# Patient Record
Sex: FEMALE | Race: WHITE | NOT HISPANIC OR LATINO | URBAN - METROPOLITAN AREA
[De-identification: names, ages, dates, MRNs, and addresses within clinical notes are randomized per-mention and may not be internally consistent; named-entity substitution may affect disease eponyms.]

---

## 2017-09-25 ENCOUNTER — FOLLOW UP ESTABLISHED (OUTPATIENT)
Dept: URBAN - METROPOLITAN AREA CLINIC 24 | Facility: CLINIC | Age: 72
End: 2017-09-25
Payer: MEDICARE

## 2017-09-25 DIAGNOSIS — H18.59 OTHER HEREDITARY CORNEAL DYSTROPHIES: ICD-10-CM

## 2017-09-25 DIAGNOSIS — Z98.890 OTHER SPECIFIED POSTPROCEDURAL STATES: ICD-10-CM

## 2017-09-25 PROCEDURE — 92014 COMPRE OPH EXAM EST PT 1/>: CPT | Performed by: OPTOMETRIST

## 2017-09-25 ASSESSMENT — INTRAOCULAR PRESSURE
OD: 26
OS: 24

## 2017-09-25 ASSESSMENT — VISUAL ACUITY
OD: 20/25
OS: 20/30

## 2017-10-13 ENCOUNTER — FOLLOW UP ESTABLISHED (OUTPATIENT)
Dept: URBAN - METROPOLITAN AREA CLINIC 24 | Facility: CLINIC | Age: 72
End: 2017-10-13
Payer: MEDICARE

## 2017-10-13 DIAGNOSIS — H40.053 OCULAR HYPERTENSION, BILATERAL: Primary | ICD-10-CM

## 2017-10-13 DIAGNOSIS — H04.123 DRY EYE SYNDROME OF BILATERAL LACRIMAL GLANDS: ICD-10-CM

## 2017-10-13 PROCEDURE — 92133 CPTRZD OPH DX IMG PST SGM ON: CPT | Performed by: OPTOMETRIST

## 2017-10-13 PROCEDURE — 92250 FUNDUS PHOTOGRAPHY W/I&R: CPT | Performed by: OPTOMETRIST

## 2017-10-13 PROCEDURE — 92083 EXTENDED VISUAL FIELD XM: CPT | Performed by: OPTOMETRIST

## 2017-10-13 PROCEDURE — 92012 INTRM OPH EXAM EST PATIENT: CPT | Performed by: OPTOMETRIST

## 2017-10-13 ASSESSMENT — INTRAOCULAR PRESSURE
OS: 27
OS: 22
OD: 25
OD: 33

## 2017-10-30 ENCOUNTER — FOLLOW UP ESTABLISHED (OUTPATIENT)
Dept: URBAN - METROPOLITAN AREA CLINIC 24 | Facility: CLINIC | Age: 72
End: 2017-10-30
Payer: MEDICARE

## 2017-10-30 PROCEDURE — 92012 INTRM OPH EXAM EST PATIENT: CPT | Performed by: OPTOMETRIST

## 2017-10-30 RX ORDER — BIMATOPROST 0.1 MG/ML
0.01 % SOLUTION/ DROPS OPHTHALMIC
Qty: 1 | Refills: 5 | Status: INACTIVE
Start: 2017-10-30 | End: 2019-02-15

## 2017-10-30 ASSESSMENT — INTRAOCULAR PRESSURE
OS: 18
OD: 18

## 2018-02-27 ENCOUNTER — FOLLOW UP ESTABLISHED (OUTPATIENT)
Dept: URBAN - METROPOLITAN AREA CLINIC 26 | Facility: CLINIC | Age: 73
End: 2018-02-27
Payer: MEDICARE

## 2018-02-27 PROCEDURE — 92012 INTRM OPH EXAM EST PATIENT: CPT | Performed by: OPTOMETRIST

## 2018-02-27 PROCEDURE — 92083 EXTENDED VISUAL FIELD XM: CPT | Performed by: OPTOMETRIST

## 2018-02-27 ASSESSMENT — INTRAOCULAR PRESSURE
OS: 23
OD: 23

## 2018-07-17 ENCOUNTER — FOLLOW UP ESTABLISHED (OUTPATIENT)
Dept: URBAN - METROPOLITAN AREA CLINIC 26 | Facility: CLINIC | Age: 73
End: 2018-07-17
Payer: MEDICARE

## 2018-07-17 PROCEDURE — 92020 GONIOSCOPY: CPT | Performed by: OPTOMETRIST

## 2018-07-17 PROCEDURE — 92012 INTRM OPH EXAM EST PATIENT: CPT | Performed by: OPTOMETRIST

## 2018-07-17 PROCEDURE — 92250 FUNDUS PHOTOGRAPHY W/I&R: CPT | Performed by: OPTOMETRIST

## 2018-07-17 ASSESSMENT — INTRAOCULAR PRESSURE
OD: 19
OS: 18

## 2018-11-19 ENCOUNTER — FOLLOW UP ESTABLISHED (OUTPATIENT)
Dept: URBAN - METROPOLITAN AREA CLINIC 26 | Facility: CLINIC | Age: 73
End: 2018-11-19
Payer: MEDICARE

## 2018-11-19 PROCEDURE — 92014 COMPRE OPH EXAM EST PT 1/>: CPT | Performed by: OPTOMETRIST

## 2018-11-19 PROCEDURE — 92083 EXTENDED VISUAL FIELD XM: CPT | Performed by: OPTOMETRIST

## 2018-11-19 PROCEDURE — 92133 CPTRZD OPH DX IMG PST SGM ON: CPT | Performed by: OPTOMETRIST

## 2018-11-19 ASSESSMENT — VISUAL ACUITY
OS: 20/25
OD: 20/25

## 2018-11-19 ASSESSMENT — INTRAOCULAR PRESSURE
OS: 17
OD: 15

## 2019-05-21 ENCOUNTER — FOLLOW UP ESTABLISHED (OUTPATIENT)
Dept: URBAN - METROPOLITAN AREA CLINIC 26 | Facility: CLINIC | Age: 74
End: 2019-05-21
Payer: MEDICARE

## 2019-05-21 DIAGNOSIS — H10.45 OTHER CHRONIC ALLERGIC CONJUNCTIVITIS: ICD-10-CM

## 2019-05-21 PROCEDURE — 92250 FUNDUS PHOTOGRAPHY W/I&R: CPT | Performed by: OPTOMETRIST

## 2019-05-21 PROCEDURE — 92012 INTRM OPH EXAM EST PATIENT: CPT | Performed by: OPTOMETRIST

## 2019-05-21 ASSESSMENT — INTRAOCULAR PRESSURE
OS: 20
OD: 18

## 2019-07-29 ENCOUNTER — FOLLOW UP ESTABLISHED (OUTPATIENT)
Dept: URBAN - METROPOLITAN AREA CLINIC 26 | Facility: CLINIC | Age: 74
End: 2019-07-29
Payer: MEDICARE

## 2019-07-29 PROCEDURE — 92133 CPTRZD OPH DX IMG PST SGM ON: CPT | Performed by: OPTOMETRIST

## 2019-07-29 PROCEDURE — 92012 INTRM OPH EXAM EST PATIENT: CPT | Performed by: OPTOMETRIST

## 2019-07-29 ASSESSMENT — KERATOMETRY
OD: 43.88
OS: 44.88

## 2019-07-29 ASSESSMENT — INTRAOCULAR PRESSURE
OD: 14
OS: 21

## 2019-07-29 ASSESSMENT — VISUAL ACUITY
OS: 20/40
OD: 20/30

## 2020-01-13 ENCOUNTER — FOLLOW UP ESTABLISHED (OUTPATIENT)
Dept: URBAN - METROPOLITAN AREA CLINIC 26 | Facility: CLINIC | Age: 75
End: 2020-01-13
Payer: MEDICARE

## 2020-01-13 DIAGNOSIS — H35.3122 NONEXUDATIVE MACULAR DEGENERATION, INTERMEDIATE DRY STAGE, LEFT EYE: ICD-10-CM

## 2020-01-13 DIAGNOSIS — Z96.1 PRESENCE OF INTRAOCULAR LENS: ICD-10-CM

## 2020-01-13 PROCEDURE — 92134 CPTRZ OPH DX IMG PST SGM RTA: CPT | Performed by: OPTOMETRIST

## 2020-01-13 PROCEDURE — 92083 EXTENDED VISUAL FIELD XM: CPT | Performed by: OPTOMETRIST

## 2020-01-13 PROCEDURE — 92014 COMPRE OPH EXAM EST PT 1/>: CPT | Performed by: OPTOMETRIST

## 2020-01-13 RX ORDER — BIMATOPROST 0.1 MG/ML
0.01 % SOLUTION/ DROPS OPHTHALMIC
Qty: 3 | Refills: 3 | Status: INACTIVE
Start: 2020-01-13 | End: 2021-07-13

## 2020-01-13 ASSESSMENT — KERATOMETRY
OD: 43.75
OS: 44.75

## 2020-01-13 ASSESSMENT — VISUAL ACUITY
OD: 20/30
OS: 20/50

## 2020-01-13 ASSESSMENT — INTRAOCULAR PRESSURE
OD: 14
OS: 20

## 2020-07-13 ENCOUNTER — FOLLOW UP ESTABLISHED (OUTPATIENT)
Dept: URBAN - METROPOLITAN AREA CLINIC 26 | Facility: CLINIC | Age: 75
End: 2020-07-13
Payer: MEDICARE

## 2020-07-13 PROCEDURE — 92012 INTRM OPH EXAM EST PATIENT: CPT | Performed by: OPTOMETRIST

## 2020-07-13 PROCEDURE — 92133 CPTRZD OPH DX IMG PST SGM ON: CPT | Performed by: OPTOMETRIST

## 2020-07-13 ASSESSMENT — INTRAOCULAR PRESSURE
OD: 13
OD: 14
OS: 17

## 2021-01-13 ENCOUNTER — FOLLOW UP ESTABLISHED (OUTPATIENT)
Dept: URBAN - METROPOLITAN AREA CLINIC 26 | Facility: CLINIC | Age: 76
End: 2021-01-13
Payer: MEDICARE

## 2021-01-13 DIAGNOSIS — H35.3131 NONEXUDATIVE MACULAR DEGENERATION, EARLY DRY STAGE, BILATERAL: ICD-10-CM

## 2021-01-13 PROCEDURE — 92134 CPTRZ OPH DX IMG PST SGM RTA: CPT | Performed by: OPTOMETRIST

## 2021-01-13 PROCEDURE — 92083 EXTENDED VISUAL FIELD XM: CPT | Performed by: OPTOMETRIST

## 2021-01-13 PROCEDURE — 92014 COMPRE OPH EXAM EST PT 1/>: CPT | Performed by: OPTOMETRIST

## 2021-01-13 ASSESSMENT — KERATOMETRY
OS: 46.75
OD: 43.75

## 2021-01-13 ASSESSMENT — VISUAL ACUITY
OD: 20/30
OS: 20/70

## 2021-01-13 ASSESSMENT — INTRAOCULAR PRESSURE
OS: 20
OD: 19

## 2021-04-23 ENCOUNTER — OFFICE VISIT (OUTPATIENT)
Dept: URBAN - METROPOLITAN AREA CLINIC 26 | Facility: CLINIC | Age: 76
End: 2021-04-23
Payer: MEDICARE

## 2021-04-23 DIAGNOSIS — H43.393 OTHER VITREOUS OPACITIES, BILATERAL: ICD-10-CM

## 2021-04-23 PROCEDURE — 92134 CPTRZ OPH DX IMG PST SGM RTA: CPT | Performed by: OPTOMETRIST

## 2021-04-23 PROCEDURE — 99213 OFFICE O/P EST LOW 20 MIN: CPT | Performed by: OPTOMETRIST

## 2021-04-23 ASSESSMENT — INTRAOCULAR PRESSURE
OD: 18
OS: 19

## 2021-04-23 NOTE — IMPRESSION/PLAN
Impression: Ocular hypertension, bilateral
 Pachs Avg; TMax 33/27 Plan: Cupping OD>OS. lumigan self discontinued 2/2 increased irritation
rnfl oct (07/13/20) od: 76, wnl os: 111, wnl
vf 24-2 (01/13/21) od: temporal defect os: full (decreased reliability OS) IOP stable and within reasonable range. d/c lumigan.  start dorzolamide bid OU. 
rtc 1 month IOP check

## 2021-04-23 NOTE — IMPRESSION/PLAN
Impression: Tear film insufficiency of bilateral lacrimal glands Plan: Recommend artificial tears at least 4 times a day and gel drop or tear ointment at bedtime, Omega 3 fatty acids (2-3,000 mg) daily.

## 2021-04-23 NOTE — IMPRESSION/PLAN
Impression: Other vitreous opacities, bilateral: H43.393. Plan: No retinal pathology. No family history, prior peripheral retinal disease, or other risk factors evident. Warning signs of retinal tear and detachment discussed with patient.  To return to clinic STAT if any change in symptoms consistent with RT or RD. rtc 1 month dilated f/u or prn

## 2021-07-13 ENCOUNTER — OFFICE VISIT (OUTPATIENT)
Dept: URBAN - METROPOLITAN AREA CLINIC 26 | Facility: CLINIC | Age: 76
End: 2021-07-13
Payer: MEDICARE

## 2021-07-13 PROCEDURE — 99213 OFFICE O/P EST LOW 20 MIN: CPT | Performed by: OPTOMETRIST

## 2021-07-13 RX ORDER — DORZOLAMIDE HCL 20 MG/ML
2 % SOLUTION/ DROPS OPHTHALMIC
Qty: 15 | Refills: 3 | Status: ACTIVE
Start: 2021-07-13

## 2021-07-13 ASSESSMENT — INTRAOCULAR PRESSURE
OS: 11
OD: 12

## 2021-07-13 NOTE — IMPRESSION/PLAN
Impression: Ocular hypertension, bilateral
 Pachs Avg; TMax 33/27 Plan: Cupping OD>OS. currently taking dorzolamide bid OU
rnfl oct (07/13/20) od: 76, wnl os: 111, wnl
vf 24-2 (01/13/21) od: temporal defect os: full (decreased reliability OS) IOP controlled with current medication.  
continue dorzolamide bid OU. 
rtc 6 months complete exam and vf 24-2